# Patient Record
(demographics unavailable — no encounter records)

---

## 2024-12-16 NOTE — HISTORY OF PRESENT ILLNESS
[FreeTextEntry1] : no recent PSA. he has been having urgency. no dysuria. he has a weak stream and feels that he has to streain to void.  Nocturia X 3-4 but drinks lots of fluids. Has not limited them before going to sleep.

## 2024-12-16 NOTE — PHYSICAL EXAM
[Normal Appearance] : normal appearance [Well Groomed] : well groomed [General Appearance - In No Acute Distress] : no acute distress [Edema] : no peripheral edema [] : no respiratory distress [Respiration, Rhythm And Depth] : normal respiratory rhythm and effort [Exaggerated Use Of Accessory Muscles For Inspiration] : no accessory muscle use [Normal Station and Gait] : the gait and station were normal for the patient's age [Skin Color & Pigmentation] : normal skin color and pigmentation [Oriented To Time, Place, And Person] : oriented to person, place, and time [Affect] : the affect was normal [Mood] : the mood was normal [No Palpable Adenopathy] : no palpable adenopathy

## 2024-12-16 NOTE — ASSESSMENT
[FreeTextEntry1] : Impression:  BPH diabetes frequency, possibly due to combination of diabetes and BPH.   Plan:  start tamsulosin check HbA1C Check free and total PSA.  follow-up in three weeks.

## 2024-12-30 NOTE — LETTER BODY
[Dear  ___] : Dear  [unfilled], [Courtesy Letter:] : I had the pleasure of seeing your patient, [unfilled], in my office today. [Please see my note below.] : Please see my note below. [Sincerely,] : Sincerely, [FreeTextEntry3] : Ed  Bryce Harris MD [facility]  [facility] [place] [facility] [employer] at [facility]

## 2024-12-30 NOTE — ASSESSMENT
[FreeTextEntry1] : Impressino:  elevated HbA1C normal PSA BPH  Plan;  recommend he take his tamsulosin daily follow up in 3 months.